# Patient Record
Sex: MALE | Race: WHITE
[De-identification: names, ages, dates, MRNs, and addresses within clinical notes are randomized per-mention and may not be internally consistent; named-entity substitution may affect disease eponyms.]

---

## 2020-01-25 ENCOUNTER — HOSPITAL ENCOUNTER (EMERGENCY)
Dept: HOSPITAL 25 - ED | Age: 25
LOS: 1 days | Discharge: HOME | End: 2020-01-26
Payer: COMMERCIAL

## 2020-01-25 DIAGNOSIS — R10.84: Primary | ICD-10-CM

## 2020-01-25 DIAGNOSIS — R50.9: ICD-10-CM

## 2020-01-25 PROCEDURE — 85025 COMPLETE CBC W/AUTO DIFF WBC: CPT

## 2020-01-25 PROCEDURE — 36415 COLL VENOUS BLD VENIPUNCTURE: CPT

## 2020-01-25 PROCEDURE — 86140 C-REACTIVE PROTEIN: CPT

## 2020-01-25 PROCEDURE — 87040 BLOOD CULTURE FOR BACTERIA: CPT

## 2020-01-25 PROCEDURE — 80053 COMPREHEN METABOLIC PANEL: CPT

## 2020-01-25 PROCEDURE — 81003 URINALYSIS AUTO W/O SCOPE: CPT

## 2020-01-25 PROCEDURE — 96374 THER/PROPH/DIAG INJ IV PUSH: CPT

## 2020-01-25 PROCEDURE — 83690 ASSAY OF LIPASE: CPT

## 2020-01-25 PROCEDURE — 99282 EMERGENCY DEPT VISIT SF MDM: CPT

## 2020-01-25 PROCEDURE — 83605 ASSAY OF LACTIC ACID: CPT

## 2020-01-25 PROCEDURE — 96361 HYDRATE IV INFUSION ADD-ON: CPT

## 2020-01-25 NOTE — ED
Abdominal Pain/Male





- HPI Summary


HPI Summary: 





Patient presents with fever and diffuse abdominal pain radiating to back 

starting this morning.  Pain described as intermittent, achy, new onset, at 

worst 4/10, lasts for about an hour.  Denies fever, cough, sore throat, CP, SOB

, N/V/D, change in urine, change in BM, penile or testicular symptoms.  Medical 

history is none.  Abdominal surgical history is none.  Patient has not taken 

any medications. 





- History of Current Complaint


Chief Complaint: EDFever


Stated Complaint: NAUSEA PER EMS


Time Seen by Provider: 01/25/20 23:24


Hx Obtained From: Patient


Onset/Duration: Sudden Onset, Lasting Hours


Timing: Intermittent, Lasting Minutes


Severity Initially: Moderate


Severity Currently: Moderate


Pain Intensity: 4


Pain Scale Used: 0-10 Numeric


Location: Diffuse


Radiates to: Back


Character: Dull


Aggravating Factor(s): Nothing


Alleviating Factor(s): Nothing


Associated Signs And Symptoms: Positive: Negative





- Allergies/Home Medications


Allergies/Adverse Reactions: 


 Allergies











Allergy/AdvReac Type Severity Reaction Status Date / Time


 


No Known Allergies Allergy   Verified 01/26/20 01:00











Home Medications: 


 Home Medications





NK [No Home Medications Reported]  01/26/20 [History Confirmed 01/26/20]











PMH/Surg Hx/FS Hx/Imm Hx


Endocrine/Hematology History: 


   Denies: Hx Anticoagulant Therapy


Cardiovascular History: 


   Denies: Hx Pacemaker/ICD


 History: 


   Denies: Hx Dialysis


Sensory History: 


   Denies: Hx Eye Prosthesis


Opthamlomology History: 


   Denies: Hx Legally Blind


EENT History: 


   Denies: Hx Deafness


Neurological History: 


   Denies: Hx Dementia


Infectious Disease History: No


Infectious Disease History: 


   Denies: Traveled Outside the [place] in Last 30 Days





- Family History


Known Family History: Positive: Non-Contributory





- Social History


Alcohol Use: Occasionally


Substance Use Type: Reports: None


Smoking Status (MU): Never Smoked Tobacco





Review of Systems


Positive: Fever


Eyes: Negative


ENT: Negative


Cardiovascular: Negative


Respiratory: Negative


Positive: Abdominal Pain


Genitourinary: Negative


Musculoskeletal: Negative


Skin: Negative


Neurological: Negative


Psychological: Normal


All Other Systems Reviewed And Are Negative: Yes





Physical Exam





- Summary


Physical Exam Summary: 





Abdomen mildly tender diffusely.


Triage Information Reviewed: Yes


Vital Signs On Initial Exam: 


 Initial Vitals











Temp Pulse Resp BP Pulse Ox


 


 102.8 F   91   16   145/101   98 


 


 01/25/20 23:23  01/25/20 23:23  01/25/20 23:23  01/25/20 23:23  01/25/20 23:23











Vital Signs Reviewed: Yes


Appearance: Positive: Well-Appearing


Skin: Positive: Warm


Head/Face: Positive: Normal Head/Face Inspection


Eyes: Positive: Normal


Neck: Positive: Supple


Respiratory/Lung Sounds: Positive: Clear to Auscultation


Cardiovascular: Positive: Normal


Abdomen Description: Positive: Other:


Musculoskeletal: Positive: Normal


Neurological: Positive: Normal


Psychiatric: Positive: Normal


AVPU Assessment: Alert





- [place] Coma Scale


Best Eye Response: 4 - Spontaneous


Best Motor Response: 6 - Obeys Commands


Best Verbal Response: 5 - Oriented


Coma Scale Total: 15





Procedures





- Sedation


Patient Received Moderate/Deep Sedation with Procedure: No





Diagnostics





- Vital Signs


 Vital Signs











  Temp Pulse Resp BP Pulse Ox


 


 01/25/20 23:23  102.8 F  91  16  145/101  98














- Laboratory


Result Diagrams: 


 01/25/20 23:44





 01/25/20 23:44


Lab Statement: Any lab studies that have been ordered have been reviewed, and 

results considered in the medical decision making process.





Abdominal Pain Male Course/Dx





- Course


Course Of Treatment: Patient presents with fever and diffuse abdominal pain 

radiating to back starting this morning.  Pain described as intermittent, achy, 

new onset, at worst 4/10, lasts for about an hour.  Denies fever, cough, sore 

throat, CP, SOB, N/V/D, change in urine, change in BM, penile or testicular 

symptoms.  Medical history is none.  Abdominal surgical history is none.  

Patient has not taken any medications.  Temp 102.8 in triage.  Vital signs 

otherwise within normal limits.  CRP 13.  Potassium 3.4.  Labs otherwise within 

normal limits.  Urine negative.  Discussed pros and cons of CT scan of abdomen 

and pelvis with patient who at this time deferred CT scan for watchful waiting.

  Fever resolved with antipyretics.





- Diagnoses


Provider Diagnoses: 


 Fever, Abdominal pain








Discharge ED





- Sign-Out/Discharge


Documenting (check all that apply): Patient Departure





- Discharge Plan


Condition: Stable


Disposition: HOME


Patient Education Materials:  Fever in Adults (ED), Abdominal Pain (ED)


Referrals: 


No Primary Care Phys,NOPCP [Primary Care Provider] - 


Additional Instructions: 


Alternate ibuprofen 600 mg with Tylenol 650 mg every 3 hours as needed for 

fever and stomach pain.  Drink plenty of fluids.  Return to the ED for any new 

or worsening symptoms.





- Billing Disposition and Condition


Condition: STABLE


Disposition: Home

## 2020-01-26 VITALS — SYSTOLIC BLOOD PRESSURE: 134 MMHG | DIASTOLIC BLOOD PRESSURE: 74 MMHG

## 2020-01-26 LAB
ALBUMIN SERPL BCG-MCNC: 4.7 G/DL (ref 3.2–5.2)
ALBUMIN/GLOB SERPL: 1.9 {RATIO} (ref 1–3)
ALP SERPL-CCNC: 86 U/L (ref 34–104)
ALT SERPL W P-5'-P-CCNC: 24 U/L (ref 7–52)
ANION GAP SERPL CALC-SCNC: 8 MMOL/L (ref 2–11)
AST SERPL-CCNC: 21 U/L (ref 13–39)
BASOPHILS # BLD AUTO: 0 10^3/UL (ref 0–0.2)
BUN SERPL-MCNC: 16 MG/DL (ref 6–24)
BUN/CREAT SERPL: 16.5 (ref 8–20)
CALCIUM SERPL-MCNC: 9 MG/DL (ref 8.6–10.3)
CHLORIDE SERPL-SCNC: 100 MMOL/L (ref 101–111)
EOSINOPHIL # BLD AUTO: 0 10^3/UL (ref 0–0.6)
GLOBULIN SER CALC-MCNC: 2.5 G/DL (ref 2–4)
GLUCOSE SERPL-MCNC: 107 MG/DL (ref 70–100)
HCO3 SERPL-SCNC: 28 MMOL/L (ref 22–32)
HCT VFR BLD AUTO: 47 % (ref 42–52)
HGB BLD-MCNC: 16.8 G/DL (ref 14–18)
LYMPHOCYTES # BLD AUTO: 0.3 10^3/UL (ref 1–4.8)
MCH RBC QN AUTO: 31 PG (ref 27–31)
MCHC RBC AUTO-ENTMCNC: 36 G/DL (ref 31–36)
MCV RBC AUTO: 87 FL (ref 80–94)
MONOCYTES # BLD AUTO: 0.4 10^3/UL (ref 0–0.8)
NEUTROPHILS # BLD AUTO: 7.2 10^3/UL (ref 1.5–7.7)
NRBC # BLD AUTO: 0 10^3/UL
NRBC BLD QL AUTO: 0.1
PLATELET # BLD AUTO: 196 10^3/UL (ref 150–450)
POTASSIUM SERPL-SCNC: 3.4 MMOL/L (ref 3.5–5)
PROT SERPL-MCNC: 7.2 G/DL (ref 6.4–8.9)
RBC # BLD AUTO: 5.38 10^6 /UL (ref 4.18–5.48)
SODIUM SERPL-SCNC: 136 MMOL/L (ref 135–145)
WBC # BLD AUTO: 8 10^3/UL (ref 3.5–10.8)